# Patient Record
Sex: FEMALE
[De-identification: names, ages, dates, MRNs, and addresses within clinical notes are randomized per-mention and may not be internally consistent; named-entity substitution may affect disease eponyms.]

---

## 2022-04-08 ENCOUNTER — APPOINTMENT (OUTPATIENT)
Dept: PHYSICAL MEDICINE AND REHAB | Facility: CLINIC | Age: 72
End: 2022-04-08
Payer: MEDICARE

## 2022-04-08 DIAGNOSIS — Z86.79 PERSONAL HISTORY OF OTHER DISEASES OF THE CIRCULATORY SYSTEM: ICD-10-CM

## 2022-04-08 DIAGNOSIS — Z60.2 PROBLEMS RELATED TO LIVING ALONE: ICD-10-CM

## 2022-04-08 DIAGNOSIS — Z87.11 PERSONAL HISTORY OF PEPTIC ULCER DISEASE: ICD-10-CM

## 2022-04-08 DIAGNOSIS — Z78.9 OTHER SPECIFIED HEALTH STATUS: ICD-10-CM

## 2022-04-08 PROCEDURE — 99204 OFFICE O/P NEW MOD 45 MIN: CPT

## 2022-04-08 PROCEDURE — 72100 X-RAY EXAM L-S SPINE 2/3 VWS: CPT

## 2022-04-08 RX ORDER — AMLODIPINE BESYLATE 5 MG/1
TABLET ORAL
Refills: 0 | Status: ACTIVE | COMMUNITY

## 2022-04-08 RX ORDER — FEXOFENADINE HYDROCHLORIDE 180 MG/1
TABLET ORAL
Refills: 0 | Status: ACTIVE | COMMUNITY

## 2022-04-08 RX ORDER — FAMOTIDINE 10 MG/1
TABLET, FILM COATED ORAL
Refills: 0 | Status: ACTIVE | COMMUNITY

## 2022-04-08 RX ORDER — CYCLOBENZAPRINE HYDROCHLORIDE 7.5 MG/1
TABLET, FILM COATED ORAL
Refills: 0 | Status: ACTIVE | COMMUNITY

## 2022-04-08 SDOH — SOCIAL STABILITY - SOCIAL INSECURITY: PROBLEMS RELATED TO LIVING ALONE: Z60.2

## 2022-04-08 NOTE — HISTORY OF PRESENT ILLNESS
[FreeTextEntry1] : Location: back\par Severity: 8/10\par Duration: few months\par Timing: chronic\par Aggravating Factors: walking\par Alleviating Factors: rest\par Associated Symptoms: denies weight loss, fever, chills, change in bowel/bladder habits, redness, warmth, weakness, numbness/tingling, +radiation down right leg\par

## 2022-04-08 NOTE — PHYSICAL EXAM
[FreeTextEntry1] : JAMIE is a 71 year female \par Constitutional: healthy appearing, NAD, and overweight\par \par LUMBAR\par ROM: flexion to 30 deg, ext to 5 deg\par \par Gait: antalgic\par \par Inspection: no erythema, warmth\par Spine: no TTP in spinous process\par Bony palpation: no TTP in GT\par \par Soft tissue palpation hip: no TTP in gluteus martha\par Soft tissue palpation of spine: no TTP in lumbar paraspinals\par \par 5/5 bilateral KE, DF, PF \par sensation intact in bilat LE\par reflexes: knee and ankle 0 bilat\par \par Special tests: neg seated SLR\par \par

## 2022-04-08 NOTE — ASSESSMENT
[FreeTextEntry1] : Discussed diagnosis and treatment plan including PT.\par Reviewed exercises to do. \par She cannot take regular nsaids so will try Celebrex.  She will check with GI doctor first.\par Ice area often.\par Prednisone did not help so likely needs FANY.\par \par

## 2022-04-08 NOTE — DATA REVIEWED
[FreeTextEntry1] : office xrays of lumbar spine ap and lateral show mild DDD diffusely, grade 1 L4/5 anterolisthesis, moderate facet arthritis at L4-S1, incidental moderate hip OA bilaterally.

## 2022-04-25 ENCOUNTER — NON-APPOINTMENT (OUTPATIENT)
Age: 72
End: 2022-04-25

## 2022-04-26 ENCOUNTER — NON-APPOINTMENT (OUTPATIENT)
Age: 72
End: 2022-04-26

## 2022-04-29 RX ORDER — CELECOXIB 200 MG/1
200 CAPSULE ORAL DAILY
Qty: 14 | Refills: 0 | Status: ACTIVE | COMMUNITY
Start: 2022-04-08 | End: 1900-01-01

## 2022-05-05 ENCOUNTER — APPOINTMENT (OUTPATIENT)
Dept: PHYSICAL MEDICINE AND REHAB | Facility: CLINIC | Age: 72
End: 2022-05-05

## 2022-05-11 ENCOUNTER — APPOINTMENT (OUTPATIENT)
Dept: PHYSICAL MEDICINE AND REHAB | Facility: CLINIC | Age: 72
End: 2022-05-11
Payer: MEDICARE

## 2022-05-11 PROCEDURE — 64484 NJX AA&/STRD TFRM EPI L/S EA: CPT

## 2022-05-11 PROCEDURE — 64483 NJX AA&/STRD TFRM EPI L/S 1: CPT

## 2022-05-25 ENCOUNTER — APPOINTMENT (OUTPATIENT)
Dept: PHYSICAL MEDICINE AND REHAB | Facility: CLINIC | Age: 72
End: 2022-05-25
Payer: MEDICARE

## 2022-05-25 PROCEDURE — 99214 OFFICE O/P EST MOD 30 MIN: CPT | Mod: 25

## 2022-05-25 PROCEDURE — 20552 NJX 1/MLT TRIGGER POINT 1/2: CPT

## 2022-05-25 NOTE — ASSESSMENT
[FreeTextEntry1] : Discussed diagnosis and treatment plan including PT.  Starting PT soon.\par Reviewed exercises to do. \par She cannot take regular nsaids so will try Celebrex. Reminded to check with GI doctor first.\par Ice area often.\par Taught stretches for hamstring and calf.  Do it 3x/d. \par Try Gabapentin before sleeping. \par \par f/u 3 wk

## 2022-05-25 NOTE — HISTORY OF PRESENT ILLNESS
[FreeTextEntry1] : Location: back\par Severity: 7/10\par Duration: few months\par Timing: chronic\par Aggravating Factors: walking\par Alleviating Factors: rest\par Associated Symptoms: denies weight loss, fever, chills, change in bowel/bladder habits, redness, warmth, weakness, numbness, +tingling, +radiation down right leg, cramps\par \par 5/2022 right L5 and S1 FANY - moderate relief

## 2022-05-25 NOTE — PHYSICAL EXAM
[FreeTextEntry1] : JAMIE is a 71 year female \par Constitutional: healthy appearing, NAD, and overweight\par \par LUMBAR\par ROM: flexion to 30 deg, ext to 5 deg\par \par Gait: antalgic\par \par Inspection: no erythema, warmth\par Spine: no TTP in spinous process\par Bony palpation: no TTP in GT\par \par Soft tissue palpation hip: no TTP in gluteus martha\par Soft tissue palpation of spine: no TTP in lumbar paraspinals\par \par 5/5 bilateral KE, DF, PF \par sensation intact in bilat LE\par

## 2022-05-25 NOTE — PROCEDURE
[de-identified] : Indication: myofascial pain\par \par \par \par Trigger Point Tx\par After cleaning with alcohol, sterile needles were inserted into bilateral Ub 25 to 26, and right lateral hamstring and peroneus longus and manipulated intermittently followed by injection of 0.1 ml of 1% Lidocaine. The needles were then removed. Hemostasis was achieved immediately. She  tolerated the procedure well and was given discharge instructions and then discharged to home without any complaints or complications.\par \par Exp 5/2023\par Manufacture: [employer]\par NDC 2556-0679-20\par [id number]\par

## 2022-06-16 ENCOUNTER — APPOINTMENT (OUTPATIENT)
Dept: PHYSICAL MEDICINE AND REHAB | Facility: CLINIC | Age: 72
End: 2022-06-16
Payer: MEDICARE

## 2022-06-16 PROCEDURE — 99214 OFFICE O/P EST MOD 30 MIN: CPT | Mod: 25

## 2022-06-16 PROCEDURE — 20552 NJX 1/MLT TRIGGER POINT 1/2: CPT

## 2022-06-16 RX ORDER — BUSPIRONE HYDROCHLORIDE 10 MG/1
10 TABLET ORAL
Qty: 60 | Refills: 0 | Status: ACTIVE | COMMUNITY
Start: 2021-08-16

## 2022-06-16 RX ORDER — DESOXIMETASONE 0.5 MG/G
0.05 CREAM TOPICAL
Qty: 60 | Refills: 0 | Status: ACTIVE | COMMUNITY
Start: 2022-06-02

## 2022-06-16 RX ORDER — PREDNISONE 10 MG/1
10 TABLET ORAL
Qty: 14 | Refills: 0 | Status: ACTIVE | COMMUNITY
Start: 2022-03-24

## 2022-06-16 RX ORDER — DICLOFENAC SODIUM 1% 10 MG/G
1 GEL TOPICAL
Qty: 100 | Refills: 0 | Status: ACTIVE | COMMUNITY
Start: 2022-06-02

## 2022-06-16 RX ORDER — FAMOTIDINE 40 MG/1
40 TABLET, FILM COATED ORAL
Qty: 30 | Refills: 0 | Status: ACTIVE | COMMUNITY
Start: 2021-08-16

## 2022-06-16 RX ORDER — FLUTICASONE PROPIONATE 50 UG/1
50 SPRAY, METERED NASAL
Qty: 16 | Refills: 0 | Status: ACTIVE | COMMUNITY
Start: 2022-04-01

## 2022-06-16 RX ORDER — MONTELUKAST 10 MG/1
10 TABLET, FILM COATED ORAL
Qty: 30 | Refills: 0 | Status: ACTIVE | COMMUNITY
Start: 2021-06-23

## 2022-06-16 RX ORDER — AMLODIPINE BESYLATE 10 MG/1
10 TABLET ORAL
Qty: 30 | Refills: 0 | Status: ACTIVE | COMMUNITY
Start: 2022-04-01

## 2022-06-16 RX ORDER — OMEPRAZOLE 20 MG/1
20 CAPSULE, DELAYED RELEASE ORAL
Qty: 30 | Refills: 0 | Status: ACTIVE | COMMUNITY
Start: 2022-04-01

## 2022-06-16 RX ORDER — GLECAPREVIR AND PIBRENTASVIR 40; 100 MG/1; MG/1
100-40 TABLET, FILM COATED ORAL
Qty: 84 | Refills: 0 | Status: ACTIVE | COMMUNITY
Start: 2022-01-03

## 2022-06-16 NOTE — HISTORY OF PRESENT ILLNESS
[FreeTextEntry1] : Location: back\par Severity: 5/10\par Duration: few months\par Timing: chronic\par Aggravating Factors: walking\par Alleviating Factors: rest\par Associated Symptoms: denies weight loss, fever, chills, change in bowel/bladder habits, redness, warmth, weakness, numbness, +tingling, +radiation down right leg, cramps\par \par 5/2022 right L5 and S1 FANY - moderate relief \par

## 2022-06-16 NOTE — PROCEDURE
[de-identified] : Indication: myofascial pain\par \par \par \par Trigger Point Tx\par After cleaning with alcohol, sterile needles were inserted into right Ub 25 to 26, gluteus martha, lateral hamstring, calf and Gb 39 and manipulated intermittently followed by injection of 0.1 ml of 1% Lidocaine. The needles were then removed. Hemostasis was achieved immediately. She tolerated the procedure well and was given discharge instructions and then discharged to home without any complaints or complications.\par \par Exp 5/2023\par Manufacture: [employer]\par NDC 9060-6539-63\par [id number]

## 2022-06-16 NOTE — PHYSICAL EXAM
[FreeTextEntry1] : JAMIE is a 71 year female \par Constitutional: healthy appearing, NAD, and overweight\par \par LUMBAR\par ROM: flexion to 30 deg, ext to 5 deg\par \par Gait: antalgic\par \par Inspection: no erythema, warmth\par Spine: no TTP in spinous process\par Bony palpation: no TTP in GT\par \par Soft tissue palpation hip: no TTP in gluteus martha\par Soft tissue palpation of spine: no TTP in lumbar paraspinals\par \par 5/5 bilateral KE, DF, PF \par sensation intact in bilat LE

## 2022-06-16 NOTE — ASSESSMENT
[FreeTextEntry1] : Discussed diagnosis and treatment plan including PT. Continue PT. \par Reviewed exercises to do. \par She cannot take regular nsaids.  She did not want to try Celebrex so did not ask GI doctor.\par Ice area often then mansoor after exercise.\par Do stretches for hamstring and calf. Do it 3x/d. \par Reminded to try Gabapentin before sleeping. \par \par f/u 3 wk

## 2022-06-23 ENCOUNTER — APPOINTMENT (OUTPATIENT)
Dept: PHYSICAL MEDICINE AND REHAB | Facility: CLINIC | Age: 72
End: 2022-06-23
Payer: MEDICARE

## 2022-06-23 DIAGNOSIS — M79.18 MYALGIA, OTHER SITE: ICD-10-CM

## 2022-06-23 PROCEDURE — 99214 OFFICE O/P EST MOD 30 MIN: CPT | Mod: 25

## 2022-06-23 PROCEDURE — 20552 NJX 1/MLT TRIGGER POINT 1/2: CPT

## 2022-06-23 NOTE — ASSESSMENT
[FreeTextEntry1] : Discussed diagnosis and treatment plan including PT. Continue PT. \par Reviewed exercises to do. \par She cannot take regular nsaids. Try Celebrex - ask GI doctor if she can.\par Ice area often then mansoor after exercise.\par Do stretches for hamstring and calf. Do it often mansoor before sleeping.\par Reminded to try Gabapentin before sleeping. She is afraid of sedation but we are trying lowest dose. \par \par Will do joe again to avoid surgery.  Try biking.

## 2022-06-23 NOTE — HISTORY OF PRESENT ILLNESS
[FreeTextEntry1] : Location: back\par Severity: 7/10 worse lately\par Duration: few months\par Timing: chronic\par Aggravating Factors: walking\par Alleviating Factors: rest\par Associated Symptoms: denies weight loss, fever, chills, change in bowel/bladder habits, redness, warmth, weakness, numbness, +tingling, +radiation down right leg, cramps\par \par 5/2022 right L5 and S1 FANY - moderate relief \par

## 2022-06-23 NOTE — PROCEDURE
[de-identified] : Indication: myofascial pain\par \par \par \par Trigger Point Tx\par After cleaning with alcohol, sterile needles were inserted into right Ub 25 to 26, gluteus martha, lateral  anterior tib, and Gb 39 and manipulated intermittently followed by injection of 0.1 ml of 1% Lidocaine. The needles were then removed. Hemostasis was achieved immediately. She tolerated the procedure well and was given discharge instructions and then discharged to home without any complaints or complications.\par \par Exp 5/2023\par Manufacture: [employer]\par NDC 0048-1826-79\par [id number]

## 2022-07-08 ENCOUNTER — APPOINTMENT (OUTPATIENT)
Dept: PHYSICAL MEDICINE AND REHAB | Facility: CLINIC | Age: 72
End: 2022-07-08

## 2022-07-08 PROCEDURE — 62323 NJX INTERLAMINAR LMBR/SAC: CPT

## 2022-07-12 ENCOUNTER — APPOINTMENT (OUTPATIENT)
Dept: PHYSICAL MEDICINE AND REHAB | Facility: CLINIC | Age: 72
End: 2022-07-12

## 2022-07-12 PROCEDURE — 99214 OFFICE O/P EST MOD 30 MIN: CPT

## 2022-07-12 RX ORDER — GABAPENTIN 100 MG/1
100 CAPSULE ORAL
Qty: 30 | Refills: 0 | Status: ACTIVE | COMMUNITY
Start: 2022-05-03 | End: 1900-01-01

## 2022-07-12 NOTE — ASSESSMENT
[FreeTextEntry1] : Discussed diagnosis and treatment plan including PT. Restart PT. \par Reviewed exercises to do. \par Taught back stretch.\par Avoid back ext.\par Educated to lose weight.\par Do not use brace all the time and wean off shortly.\par \par f/u 1 month

## 2022-08-10 ENCOUNTER — APPOINTMENT (OUTPATIENT)
Dept: PHYSICAL MEDICINE AND REHAB | Facility: CLINIC | Age: 72
End: 2022-08-10

## 2022-08-10 DIAGNOSIS — Z00.00 ENCOUNTER FOR GENERAL ADULT MEDICAL EXAMINATION W/OUT ABNORMAL FINDINGS: ICD-10-CM

## 2022-08-10 PROCEDURE — 99214 OFFICE O/P EST MOD 30 MIN: CPT

## 2022-08-10 NOTE — ASSESSMENT
[FreeTextEntry1] : Discussed diagnosis and treatment plan including PT. Restart PT because she needs motivation to do exercise. \par Reviewed exercises to do. \par Do back stretch.\par Avoid back ext.\par Educated to quit smoking\par She was afraid to try gabapentin.  Try it at night for leg pain. \par continue turmeric\par \par f/u 1 month

## 2022-08-10 NOTE — HISTORY OF PRESENT ILLNESS
[FreeTextEntry1] : Location: back\par Severity: 4/10\par Duration: few months\par Timing: chronic\par Aggravating Factors: walking\par Alleviating Factors: rest\par Associated Symptoms: denies weight loss, fever, chills, change in bowel/bladder habits, redness, warmth, weakness, numbness, +tingling, +radiation down right leg, cramps\par \par 5/2022 right L5 and S1 FANY - moderate relief \par 6/2022 caudal FANY - 90% relief \par

## 2022-09-13 ENCOUNTER — APPOINTMENT (OUTPATIENT)
Dept: PHYSICAL MEDICINE AND REHAB | Facility: CLINIC | Age: 72
End: 2022-09-13

## 2022-09-13 DIAGNOSIS — M17.11 UNILATERAL PRIMARY OSTEOARTHRITIS, RIGHT KNEE: ICD-10-CM

## 2022-09-13 PROCEDURE — 99214 OFFICE O/P EST MOD 30 MIN: CPT

## 2022-09-13 PROCEDURE — 73562 X-RAY EXAM OF KNEE 3: CPT | Mod: RT

## 2022-09-13 NOTE — ASSESSMENT
[FreeTextEntry1] : Discussed diagnosis and treatment plan including PT. Restart PT because she needs motivation to do exercise. She did not go last month. \par Reviewed exercises to do. \par Do back stretch.\par Avoid back ext.\par Educated again to quit smoking\par She was afraid to try gabapentin. Try it at night for leg pain. \par continue turmeric\par \par knee - educated to lose weight\par ice area often

## 2022-09-13 NOTE — PHYSICAL EXAM
[FreeTextEntry1] : JAMIE is a 71 year female \par Constitutional: healthy appearing, NAD, and overweight\par \par LUMBAR\par ROM: flexion to 30 deg, ext to 5 deg\par \par Gait: antalgic\par \par Inspection: no erythema, warmth\par Spine: no TTP in spinous process\par Bony palpation: no TTP in GT\par \par Soft tissue palpation hip: no TTP in gluteus martha\par Soft tissue palpation of spine: no TTP in lumbar paraspinals\par \par 5/5 bilateral KE, DF, PF \par sensation intact in bilat LE\par \par right knee:\par no swelling, warmth\par flexion to 100 deg

## 2022-09-13 NOTE — HISTORY OF PRESENT ILLNESS
[FreeTextEntry1] : Location: back\par Severity: moderate\par Duration: few months\par Timing: chronic\par Aggravating Factors: walking\par Alleviating Factors: rest\par Associated Symptoms: denies weight loss, fever, chills, change in bowel/bladder habits, weakness, numbness, +tingling, +radiation down right leg, cramps\par \par 5/2022 right L5 and S1 FANY - moderate relief \par 6/2022 caudal FANY - 90% relief \par \par Right knee hurting lately.  7/10.  Pain with walking.  +Swelling.

## 2022-09-13 NOTE — DATA REVIEWED
[FreeTextEntry1] : office xrays of left knee ap, lat, sunrise show mild lateral and moderate PTF oa.

## 2022-10-04 ENCOUNTER — APPOINTMENT (OUTPATIENT)
Dept: PHYSICAL MEDICINE AND REHAB | Facility: CLINIC | Age: 72
End: 2022-10-04

## 2022-10-18 ENCOUNTER — APPOINTMENT (OUTPATIENT)
Dept: PHYSICAL MEDICINE AND REHAB | Facility: CLINIC | Age: 72
End: 2022-10-18

## 2022-10-18 PROCEDURE — 99213 OFFICE O/P EST LOW 20 MIN: CPT

## 2022-10-18 NOTE — HISTORY OF PRESENT ILLNESS
[FreeTextEntry1] : Location: back\par Severity: severe, worsening lately\par Duration: few months\par Timing: chronic\par Aggravating Factors: walking\par Alleviating Factors: rest\par Associated Symptoms: denies weight loss, fever, chills, change in bowel/bladder habits, weakness, numbness, +tingling, +radiation down right leg, cramps\par \par 5/2022 right L5 and S1 FANY - moderate relief \par 6/2022 caudal FANY - 90% relief \par \par

## 2022-10-18 NOTE — ASSESSMENT
[FreeTextEntry1] : Discussed diagnosis and treatment plan including PT. Restart PT because she needs motivation to do exercise. She did not go last month. \par Reviewed exercises to do. \par Educated again to quit smoking\par She was afraid to try gabapentin.\par continue turmeric\par She has tried conservative tx including PT, nsaids for 3 months without relief. Discussed FANY in detail.  Risks include bleeding.  Stop nsaids 2 days before.\par \par She will continue a comprehensive rehabilitation program afterward, as this is important to prevent recurrence of pain.\par

## 2022-11-22 ENCOUNTER — APPOINTMENT (OUTPATIENT)
Dept: PHYSICAL MEDICINE AND REHAB | Facility: CLINIC | Age: 72
End: 2022-11-22

## 2022-11-22 PROCEDURE — 62323 NJX INTERLAMINAR LMBR/SAC: CPT

## 2022-11-22 RX ORDER — AZITHROMYCIN 250 MG/1
250 TABLET, FILM COATED ORAL
Qty: 6 | Refills: 0 | Status: DISCONTINUED | COMMUNITY
Start: 2022-10-12

## 2022-11-22 NOTE — PROCEDURE
[de-identified] : indication: pain\par \par Fluoroscopically Guided, Contrast Enhanced, Caudal Epidural Steroid Injection\par \par After informed consent, she was placed prone on the fluoroscopy table.  Skin over the area was prepped and draped in the usual sterile fashion before being anesthetized with 1% Lidocaine.\par \par Then a 22 gauge 5 in needle was directed down to the sacrococcygeal ligament, sacral hiatus.  Needle placement was confirmed with AP and lateral fluoroscopic images.  After negative aspiration for blood or cerebrospinal fluid, contrast was instilled under live fluoroscopy and an epidurogram was obtained.  It showed good epidural flow without any vascular uptake.  Then a steroid solution consisting of 40 mg of Kenalog, 4 ml of 0.5% Lidocaine was instilled.  Following the procedure, the needle was removed and the skin was cleaned, the skin was cleaned, and a sterile dressing was applied.  She  tolerated the procedure well and was discharged in good condition without any complications or complaints.  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

## 2022-11-22 NOTE — ASSESSMENT
[FreeTextEntry1] : f/u 3 wk\par \par She  denies being sick or having f/c, SOB, cough.  She  understands risk of immunosuppression from steroids.  Do not get covid vaccine for 2 wk.\par

## 2023-01-05 ENCOUNTER — APPOINTMENT (OUTPATIENT)
Dept: PHYSICAL MEDICINE AND REHAB | Facility: CLINIC | Age: 73
End: 2023-01-05
Payer: MEDICARE

## 2023-01-05 PROCEDURE — 99213 OFFICE O/P EST LOW 20 MIN: CPT

## 2023-01-06 NOTE — PHYSICAL EXAM
[FreeTextEntry1] : JAMIE is a 72 year old female \par Constitutional: healthy appearing, NAD, and overweight\par \par LUMBAR\par ROM: flexion to 30 deg, ext to 5 deg\par \par Gait: antalgic\par \par Inspection: no erythema, warmth\par Spine: no TTP in spinous process\par Bony palpation: no TTP in GT\par \par Soft tissue palpation hip: no TTP in gluteus martha\par Soft tissue palpation of spine: no TTP in lumbar paraspinals\par \par 5/5 bilateral KE, DF, PF \par sensation intact in bilat LE\par

## 2023-01-06 NOTE — HISTORY OF PRESENT ILLNESS
[FreeTextEntry1] : Location: back\par Severity: 4/10\par Duration: few months\par Timing: chronic\par Aggravating Factors: walking\par Alleviating Factors: rest\par Associated Symptoms: denies weight loss, fever, chills, change in bowel/bladder habits, weakness, numbness, +tingling, +radiation down right leg, cramps\par \par 5/2022 right L5 and S1 FANY - moderate relief \par 6/2022 caudal FANY - 90% relief \par 11/2022 caudal FANY - over 50% relief

## 2023-01-06 NOTE — ASSESSMENT
[FreeTextEntry1] : Discussed diagnosis and treatment plan including PT. Restart PT because she needs motivation to do exercise. She did not go yet.  Do HEP. \par Reviewed exercises to do. \par Educated again to quit smoking\par She was afraid to try gabapentin.\par continue turmeric\par \par f/u 2 wk

## 2023-03-09 ENCOUNTER — APPOINTMENT (OUTPATIENT)
Dept: PHYSICAL MEDICINE AND REHAB | Facility: CLINIC | Age: 73
End: 2023-03-09
Payer: MEDICARE

## 2023-03-09 PROCEDURE — 99213 OFFICE O/P EST LOW 20 MIN: CPT

## 2023-03-09 NOTE — ASSESSMENT
[FreeTextEntry1] : Discussed diagnosis and treatment plan including PT. Restart PT because she needs motivation to do exercise. \par Educated to do HEP regularly even if pain improves.\par Reviewed exercises to do. Taught back ext on ball and DF exercise.\par \par Educated again to quit smoking\par She was afraid to try gabapentin.\par continue turmeric\par Discussed repeat FANY and she wants to do it in April.

## 2023-03-09 NOTE — HISTORY OF PRESENT ILLNESS
[FreeTextEntry1] : Location: back\par Severity: 6/10 starting to get worse\par Duration: few months\par Timing: chronic\par Aggravating Factors: walking\par Alleviating Factors: rest\par Associated Symptoms: denies weight loss, fever, chills, change in bowel/bladder habits, weakness, numbness, +tingling, +radiation down right leg, cramps\par \par 5/2022 right L5 and S1 FANY - moderate relief \par 6/2022 caudal FANY - 90% relief \par 11/2022 caudal FANY - over 50% relief \par

## 2023-04-17 ENCOUNTER — APPOINTMENT (OUTPATIENT)
Dept: PHYSICAL MEDICINE AND REHAB | Facility: CLINIC | Age: 73
End: 2023-04-17
Payer: MEDICARE

## 2023-04-17 PROCEDURE — 62323 NJX INTERLAMINAR LMBR/SAC: CPT

## 2023-04-17 NOTE — PROCEDURE
[de-identified] : indication: pain\par \par Fluoroscopically Guided, Contrast Enhanced, Caudal Epidural Steroid Injection\par \par After informed consent, she was placed prone on the fluoroscopy table.  Skin over the area was prepped and draped in the usual sterile fashion before being anesthetized with 1% Lidocaine.\par \par Then a 22 gauge 3 1/2 in needle was directed down to the sacrococcygeal ligament, sacral hiatus.  Needle placement was confirmed with AP and lateral fluoroscopic images.  After negative aspiration for blood or cerebrospinal fluid, contrast was instilled under live fluoroscopy and an epidurogram was obtained.  It showed good epidural flow without any vascular uptake.  Then a steroid solution consisting of 40 mg of Kenalog, 4 ml of 0.5% Lidocaine was instilled.  Following the procedure, the needle was removed and the skin was cleaned, the skin was cleaned, and a sterile dressing was applied.  She  tolerated the procedure well and was discharged in good condition without any complications or complaints.  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

## 2023-04-17 NOTE — ASSESSMENT
[FreeTextEntry1] : She  denies being sick or having f/c, SOB, cough.  She  understands risk of immunosuppression from steroids.  Do not get covid vaccine for 2 wk.\par \par She had some immediate relief.  \par \par f/u 3 wk

## 2023-05-09 ENCOUNTER — APPOINTMENT (OUTPATIENT)
Dept: PHYSICAL MEDICINE AND REHAB | Facility: CLINIC | Age: 73
End: 2023-05-09
Payer: MEDICARE

## 2023-05-09 PROCEDURE — 99214 OFFICE O/P EST MOD 30 MIN: CPT

## 2023-05-09 NOTE — HISTORY OF PRESENT ILLNESS
[FreeTextEntry1] : Location: back\par Severity: 6/10 \par Duration: few months\par Timing: chronic\par Aggravating Factors: walking\par Alleviating Factors: rest\par Associated Symptoms: denies weight loss, fever, chills, change in bowel/bladder habits, weakness, numbness, +tingling, +radiation down right leg, cramps\par \par 5/2022 right L5 and S1 FANY - moderate relief \par 6/2022 caudal FANY - 90% relief \par 11/2022 caudal FANY - over 50% relief \par 4/2023 caudal FANY

## 2023-05-09 NOTE — ASSESSMENT
[FreeTextEntry1] : Discussed diagnosis and treatment plan including PT. Restart PT because she needs motivation to do exercise. \par Educated to quit smoking. \par \par Will repeat FANY via TF route to avoid surgery.\par Try gabapentin before sleeping.\par continue turmeric\par only use 1 cream at a time\par \par

## 2023-05-26 ENCOUNTER — APPOINTMENT (OUTPATIENT)
Dept: PHYSICAL MEDICINE AND REHAB | Facility: CLINIC | Age: 73
End: 2023-05-26
Payer: MEDICARE

## 2023-05-26 PROCEDURE — 64484 NJX AA&/STRD TFRM EPI L/S EA: CPT | Mod: RT

## 2023-05-26 PROCEDURE — 64483 NJX AA&/STRD TFRM EPI L/S 1: CPT | Mod: RT

## 2023-05-26 NOTE — PROCEDURE
[de-identified] : indication: pain\par \par Fluoroscopically Guided, Contrast Enhanced, Right L5 and S1 Epidural Steroid Injection\par \par After informed consent, She was placed prone on the fluoroscopy table. Skin over the area was prepped and draped in the usual sterile fashion before being anesthetized with 1% Lidocaine. Then using an oblique approach, a 22 gauge 5 in spinal needle was directed down to the L5/S1 NF.   Needle placement was confirmed with AP fluoroscopic images. After negative aspiration for blood or cerebrospinal fluid, contrast was instilled under live fluoroscopy and an epidurogram was obtained. It showed good epidural flow without any vascular uptake. Then a steroid solution consisting of 20 mg of Kenalog, 1 ml of 0.5% Lidocaine was instilled. \par \par Then using a 22 gauge 3.5 in spinal needle was directed down to the S1 foramen.   Needle placement was confirmed with lateral fluoroscopic images. After negative aspiration for blood or cerebrospinal fluid, contrast was instilled under live fluoroscopy and an epidurogram was obtained. It showed good epidural flow without any vascular uptake. Then a steroid solution consisting of 20 mg of Kenalog, 1.5 ml of 0.5% Lidocaine was instilled. \par \par  She  was discharged in good condition.  Instructions given:  No soaking or bathing for 2 days but can take a shower.  No strenuous exercise for 4 days. \par \par \par \par Manufacture GE\par Omnipaque 240\par NDC 8644146769\par Exp 5/4/24\par [id number]\par \par Triamcinolone\par NDC [address]-8\par Exp 6/24\par Lot [id number]\par Manufacture [employer]\par \par \par Lidocaine\par Manufacture [employer]\par NDC 58701[address]-46\par Exp 6/24\par LOT [id number]\par \par

## 2023-05-26 NOTE — ASSESSMENT
[FreeTextEntry1] : Educated to quit smoking.\par \par She  denies being sick or having f/c, SOB, cough.  She  understands risk of immunosuppression from steroids.  Do not get covid vaccine for 2 wk.\par \par f/u 3 wk

## 2023-06-16 ENCOUNTER — APPOINTMENT (OUTPATIENT)
Dept: PHYSICAL MEDICINE AND REHAB | Facility: CLINIC | Age: 73
End: 2023-06-16
Payer: MEDICARE

## 2023-06-16 PROCEDURE — 99213 OFFICE O/P EST LOW 20 MIN: CPT

## 2023-06-16 NOTE — HISTORY OF PRESENT ILLNESS
[FreeTextEntry1] : Location: back\par Severity: 2/10 \par Duration: few months\par Timing: chronic\par Aggravating Factors: walking\par Alleviating Factors: rest\par Associated Symptoms: denies weight loss, fever, chills, change in bowel/bladder habits, weakness, numbness, tingling, +not much radiation down right leg\par \par 5/2022 right L5 and S1 FANY - moderate relief \par 6/2022 caudal FANY - 90% relief \par 11/2022 caudal FANY - over 50% relief \par 4/2023 caudal FANY \par 5/2023 right L5 and S1 FANY - over 60% relief

## 2023-06-16 NOTE — ASSESSMENT
[FreeTextEntry1] : Discussed diagnosis and treatment plan including PT. Restart PT because she needs motivation to do exercise. \par Educated to quit smoking. \par Does not need gabapentin at this time. \par continue turmeric\par only use 1 cream at a time\par Get ankle weights to do KE.\par \par f/u 2 months\par See podiatrist for 1st MTP pain.  Wear good shoes with wide toe box

## 2023-08-18 ENCOUNTER — APPOINTMENT (OUTPATIENT)
Dept: PHYSICAL MEDICINE AND REHAB | Facility: CLINIC | Age: 73
End: 2023-08-18
Payer: MEDICARE

## 2023-08-18 PROCEDURE — 99214 OFFICE O/P EST MOD 30 MIN: CPT

## 2023-08-18 NOTE — PHYSICAL EXAM
CBC
[FreeTextEntry1] : JAMIE is a 72 year old female  Constitutional: healthy appearing, NAD, and overweight  LUMBAR  ROM: flexion to 30 deg, ext to 5 deg  Gait: antalgic  Inspection: no erythema, warmth Spine: no TTP in spinous process Bony palpation: no TTP in GT  Soft tissue palpation hip: no TTP in gluteus martha Soft tissue palpation of spine: no TTP in lumbar paraspinals  5/5 bilateral KE, DF, PF sensation intact in bilat LE

## 2023-08-18 NOTE — HISTORY OF PRESENT ILLNESS
[FreeTextEntry1] : Location: back Severity: 5/10 getting worse Duration: few months Timing: chronic Aggravating Factors: walking Alleviating Factors: rest Associated Symptoms: denies weight loss, fever, chills, change in bowel/bladder habits, weakness, numbness, tingling, +radiation down right leg  5/2022 right L5 and S1 FANY - moderate relief 6/2022 caudal FANY - 90% relief 11/2022 caudal FANY - over 50% relief 4/2023 caudal FANY 5/2023 right L5 and S1 FANY - over 60% relief

## 2023-08-18 NOTE — ASSESSMENT
[FreeTextEntry1] : MRI shows mod/severe stenosis.  Discussed diagnosis and treatment plan including PT. Restart PT because she needs motivation to do exercise. She never went.   Educated to quit smoking. Does not need gabapentin at this time. continue turmeric use cream prn massage back with tennis ball Get ankle weights to do KE. consider joe in Sept if not better discussed surgery but does not want it at this time

## 2023-09-15 ENCOUNTER — APPOINTMENT (OUTPATIENT)
Dept: PHYSICAL MEDICINE AND REHAB | Facility: CLINIC | Age: 73
End: 2023-09-15
Payer: MEDICARE

## 2023-09-15 PROCEDURE — 99213 OFFICE O/P EST LOW 20 MIN: CPT

## 2023-10-13 ENCOUNTER — APPOINTMENT (OUTPATIENT)
Dept: PHYSICAL MEDICINE AND REHAB | Facility: CLINIC | Age: 73
End: 2023-10-13
Payer: MEDICARE

## 2023-10-13 PROCEDURE — 64493 INJ PARAVERT F JNT L/S 1 LEV: CPT | Mod: RT

## 2023-10-13 PROCEDURE — 64494 INJ PARAVERT F JNT L/S 2 LEV: CPT | Mod: RT

## 2023-11-15 ENCOUNTER — APPOINTMENT (OUTPATIENT)
Dept: PHYSICAL MEDICINE AND REHAB | Facility: CLINIC | Age: 73
End: 2023-11-15
Payer: MEDICARE

## 2023-11-15 PROCEDURE — 99213 OFFICE O/P EST LOW 20 MIN: CPT

## 2023-11-29 ENCOUNTER — APPOINTMENT (OUTPATIENT)
Dept: PHYSICAL MEDICINE AND REHAB | Facility: CLINIC | Age: 73
End: 2023-11-29
Payer: MEDICARE

## 2023-11-29 PROCEDURE — 64493 INJ PARAVERT F JNT L/S 1 LEV: CPT | Mod: RT

## 2023-11-29 PROCEDURE — 64494 INJ PARAVERT F JNT L/S 2 LEV: CPT | Mod: RT

## 2023-12-27 NOTE — HISTORY OF PRESENT ILLNESS
[FreeTextEntry1] : Location: back\par Severity: 0/10\par Duration: few months\par Timing: chronic\par Aggravating Factors: walking\par Alleviating Factors: rest\par Associated Symptoms: denies weight loss, fever, chills, change in bowel/bladder habits, redness, warmth, weakness, numbness, +tingling, +radiation down right leg, cramps\par \par 5/2022 right L5 and S1 FANY - moderate relief \par 6/2022 caudal FANY - 90% relief The patient is a 65y Male complaining of flu-like symptoms.

## 2024-03-20 ENCOUNTER — APPOINTMENT (OUTPATIENT)
Dept: PHYSICAL MEDICINE AND REHAB | Facility: CLINIC | Age: 74
End: 2024-03-20
Payer: MEDICARE

## 2024-03-20 DIAGNOSIS — M47.816 SPONDYLOSIS W/OUT MYELOPATHY OR RADICULOPATHY, LUMBAR REGION: ICD-10-CM

## 2024-03-20 PROCEDURE — 64635 DESTROY LUMB/SAC FACET JNT: CPT | Mod: RT

## 2024-03-20 PROCEDURE — 64636Z: CUSTOM | Mod: RT

## 2024-03-20 NOTE — PROCEDURE
[de-identified] : indication: pain  Radiofrequency Ablation of Right L3, L4, L5 Medial Branch Nerves  After informed consent, the patient was placed prone on the fluoroscopy table. Skin over the area was prepped and draped in the usual sterile fashion before being anesthetized with 1% Lidocaine. Then using an oblique approach, a 3  in Cosman needle with 10 mm tip was directed down to the junction of the lateral superior articular process and the sacral ala. Needle placement was confirmed with AP fluoroscopic images. After negative aspiration for blood or cerebrospinal fluid, sensory and motor stimulation was checked using the Cosman machine. There was no pain down the legs or muscle activation in the legs. Then 1 ml of 1% Lidocaine was instilled. The same procedure was repeated by placing the needle at the junction of the   L4 SAP and L5 SAP and their corresponding transverse processes. Then radiofrequency ablation was performed at 80 deg C for 90 sec at each level.  She was discharged in good condition.   Exp 6/2025 Manufacture: [employer] NDC 22382-330-04 LOT [id number]

## 2024-04-17 ENCOUNTER — APPOINTMENT (OUTPATIENT)
Dept: PHYSICAL MEDICINE AND REHAB | Facility: CLINIC | Age: 74
End: 2024-04-17
Payer: MEDICARE

## 2024-04-17 PROCEDURE — 99213 OFFICE O/P EST LOW 20 MIN: CPT

## 2024-04-17 RX ORDER — ALENDRONATE SODIUM 70 MG/1
TABLET ORAL
Refills: 0 | Status: ACTIVE | COMMUNITY

## 2024-04-17 NOTE — ASSESSMENT
[FreeTextEntry1] : Discussed diagnosis and treatment plan including PT. continue turmeric can try stationary bike still does not want gabapentin Taught hamstring stretch  She has tried conservative tx including PT, nsaids for past 3 months without relief. Discussed FANY in detail.  Risks include bleeding.  Stop nsaids 2 days before.   She will continue a comprehensive rehabilitation program afterward, as this is important to prevent recurrence of pain. DEXA reviewed.  -5.6 in lumbar, less in other areas

## 2024-04-17 NOTE — HISTORY OF PRESENT ILLNESS
[FreeTextEntry1] : Location: back and leg Severity: 8/10 worse again Duration: over 1 yr Timing: chronic Aggravating Factors: walking Alleviating Factors: rest Associated Symptoms: weight loss due to pain, denies fever, chills, change in bowel/bladder habits, weakness, numbness, tingling, +radiation down right leg  5/2022 right L5 and S1 FANY - moderate relief 6/2022 caudal FANY - 90% relief 11/2022 caudal FANY - over 50% relief 4/2023 caudal FANY 5/2023 right L5 and S1 FANY - over 60% relief 10/2023 Right L4/5 and L5/S1 Facet Injections - 90% relief 3/2024 RFA Right L3, L4, L5 Medial Branch Nerves - 80% relief in back

## 2024-04-17 NOTE — PHYSICAL EXAM
[FreeTextEntry1] : JAMIE is a 73 year old female  Constitutional: healthy appearing, NAD, and overweight  LUMBAR  ROM: flexion to 30 deg, ext to 5 deg  Gait: antalgic  Inspection: no erythema, warmth Spine: no TTP in spinous process Bony palpation: no TTP in GT  Soft tissue palpation hip: no TTP in gluteus martha Soft tissue palpation of spine: no TTP in lumbar paraspinals  5/5 bilateral KE, DF, PF sensation intact in bilat LE

## 2024-04-22 ENCOUNTER — APPOINTMENT (OUTPATIENT)
Dept: PHYSICAL MEDICINE AND REHAB | Facility: CLINIC | Age: 74
End: 2024-04-22
Payer: MEDICARE

## 2024-04-22 PROCEDURE — 64484 NJX AA&/STRD TFRM EPI L/S EA: CPT | Mod: RT

## 2024-04-22 PROCEDURE — 64483 NJX AA&/STRD TFRM EPI L/S 1: CPT | Mod: RT

## 2024-04-22 NOTE — PROCEDURE
[de-identified] : indication: pain   Fluoroscopically Guided, Contrast Enhanced, Right L5 and S1 Epidural Steroid Injection   After informed consent, She was placed prone on the fluoroscopy table. Skin over the area was prepped and draped in the usual sterile fashion before being anesthetized with 1% Lidocaine. Then using an oblique approach, a 22 gauge 5 in spinal needle was directed down to the L5/S1 NF.   Needle placement was confirmed with AP fluoroscopic images. After negative aspiration for blood or cerebrospinal fluid, contrast was instilled under live fluoroscopy and an epidurogram was obtained. It showed good epidural flow without any vascular uptake. Then a steroid solution consisting of 20 mg of Kenalog, 1 ml of 0.5% Lidocaine was instilled.    Then using a 22 gauge 3.5 in spinal needle was directed down to the S1 foramen.   Needle placement was confirmed with lateral fluoroscopic images. After negative aspiration for blood or cerebrospinal fluid, contrast was instilled under live fluoroscopy and an epidurogram was obtained. It showed good epidural flow without any vascular uptake. Then a steroid solution consisting of 20 mg of Kenalog, 1.5 ml of 0.5% Lidocaine was instilled.    She  was discharged in good condition.  Instructions given:  No soaking or bathing for 2 days but can take a shower.  No strenuous exercise for 4 days.     Manufacture GE Omnipaque 180 NDC [id number] Exp 12/15/25 [id number]      Lidocaine Manufacture [employer]abl NDC 93867-873-35 Exp 6/24 LOT [id number]   Triamcinolone NDC [address]-9 Exp 6/24 Lot [id number] Manufacture [employer]

## 2024-04-22 NOTE — ASSESSMENT
[FreeTextEntry1] : She understands risk of immunosuppression from steroids.  Do not get covid vaccine for 2 wk.  f/u 1 month

## 2024-05-29 ENCOUNTER — APPOINTMENT (OUTPATIENT)
Dept: PHYSICAL MEDICINE AND REHAB | Facility: CLINIC | Age: 74
End: 2024-05-29
Payer: MEDICARE

## 2024-05-29 DIAGNOSIS — M48.062 SPINAL STENOSIS, LUMBAR REGION WITH NEUROGENIC CLAUDICATION: ICD-10-CM

## 2024-05-29 DIAGNOSIS — M21.371 FOOT DROP, RIGHT FOOT: ICD-10-CM

## 2024-05-29 PROCEDURE — 99214 OFFICE O/P EST MOD 30 MIN: CPT

## 2024-05-29 NOTE — ASSESSMENT
[FreeTextEntry1] : Discussed diagnosis and treatment plan including PT. continue turmeric can try stationary bike still does not want gabapentin do hamstring stretch will give letter for access a ride sent for EMG  DEXA: -5.6 in lumbar, less in other areas

## 2024-05-29 NOTE — PHYSICAL EXAM
[FreeTextEntry1] : JAMIE is a 73 year old female  Constitutional: healthy appearing, NAD, and overweight  LUMBAR  ROM: flexion to 30 deg, ext to 5 deg  Gait: antalgic  Inspection: no erythema, warmth Spine: no TTP in spinous process Bony palpation: no TTP in GT  Soft tissue palpation hip: no TTP in gluteus martha Soft tissue palpation of spine: no TTP in lumbar paraspinals  5/5 bilateral KE, PF and left DF, 4 right DF and EHL sensation intact in bilat LE

## 2024-05-29 NOTE — HISTORY OF PRESENT ILLNESS
[FreeTextEntry1] : Location: back and leg Severity: 7/10  Duration: over 1 yr Timing: chronic Aggravating Factors: walking Alleviating Factors: rest Associated Symptoms: weight loss due to pain, denies fever, chills, change in bowel/bladder habits, +weakness for 3 wk, numbness, tingling, +radiation down right leg  5/2022 right L5 and S1 FANY - moderate relief 6/2022 caudal FANY - 90% relief 11/2022 caudal FANY - over 50% relief 4/2023 caudal FANY 5/2023 right L5 and S1 FANY - over 60% relief 10/2023 Right L4/5 and L5/S1 Facet Injections - 90% relief 3/2024 RFA Right L3, L4, L5 Medial Branch Nerves - 80% relief in back 4/2024 right L5 and S1 FANY

## 2024-06-05 ENCOUNTER — NON-APPOINTMENT (OUTPATIENT)
Age: 74
End: 2024-06-05

## 2024-07-23 ENCOUNTER — NON-APPOINTMENT (OUTPATIENT)
Age: 74
End: 2024-07-23

## 2024-07-24 ENCOUNTER — APPOINTMENT (OUTPATIENT)
Dept: PHYSICAL MEDICINE AND REHAB | Facility: CLINIC | Age: 74
End: 2024-07-24
Payer: MEDICARE

## 2024-07-24 VITALS
SYSTOLIC BLOOD PRESSURE: 120 MMHG | HEART RATE: 95 BPM | BODY MASS INDEX: 21.17 KG/M2 | HEIGHT: 64 IN | DIASTOLIC BLOOD PRESSURE: 88 MMHG | RESPIRATION RATE: 18 BRPM | WEIGHT: 124 LBS

## 2024-07-24 DIAGNOSIS — Z80.9 FAMILY HISTORY OF MALIGNANT NEOPLASM, UNSPECIFIED: ICD-10-CM

## 2024-07-24 DIAGNOSIS — M54.16 RADICULOPATHY, LUMBAR REGION: ICD-10-CM

## 2024-07-24 DIAGNOSIS — R20.0 ANESTHESIA OF SKIN: ICD-10-CM

## 2024-07-24 DIAGNOSIS — M48.062 SPINAL STENOSIS, LUMBAR REGION WITH NEUROGENIC CLAUDICATION: ICD-10-CM

## 2024-07-24 DIAGNOSIS — F17.200 NICOTINE DEPENDENCE, UNSPECIFIED, UNCOMPLICATED: ICD-10-CM

## 2024-07-24 DIAGNOSIS — G89.29 LOW BACK PAIN, UNSPECIFIED: ICD-10-CM

## 2024-07-24 DIAGNOSIS — M79.18 MYALGIA, OTHER SITE: ICD-10-CM

## 2024-07-24 DIAGNOSIS — M47.816 SPONDYLOSIS W/OUT MYELOPATHY OR RADICULOPATHY, LUMBAR REGION: ICD-10-CM

## 2024-07-24 DIAGNOSIS — M54.50 LOW BACK PAIN, UNSPECIFIED: ICD-10-CM

## 2024-07-24 DIAGNOSIS — R20.2 ANESTHESIA OF SKIN: ICD-10-CM

## 2024-07-24 DIAGNOSIS — M21.371 FOOT DROP, RIGHT FOOT: ICD-10-CM

## 2024-07-24 PROCEDURE — 99214 OFFICE O/P EST MOD 30 MIN: CPT

## 2024-07-24 PROCEDURE — G2211 COMPLEX E/M VISIT ADD ON: CPT

## 2024-07-24 NOTE — REASON FOR VISIT
[Initial Evaluation] : an initial evaluation [FreeTextEntry1] : for R leg pain referred by Dr. Colon and PCP Dr. Deepali Vanegas

## 2024-07-24 NOTE — REVIEW OF SYSTEMS
[Patient Intake Form Reviewed] : Patient intake form was reviewed [Joint Pain] : joint pain [Joint Stiffness] : joint stiffness [Fever] : no fever [Chills] : no chills [Fatigue] : no fatigue

## 2024-07-24 NOTE — HISTORY OF PRESENT ILLNESS
[FreeTextEntry1] :  Ms. JAMIE HUTTON is a very pleasant 73 year female who comes in for evaluation of R leg pain that has been ongoing for 1 year without any specific injury or inciting event. The pain is located primarily upper leg and ankle (feels weak) intermittent in nature and described as sharp, shooting, with numbness. The pain is rated as 8/10 during today's visit, and ranges from 2-8/10. The patient's symptoms are aggravated by walking and alleviated by rest. The patient denies any night pain, numbness/tingling, weakness, or bowel/bladder dysfunction. The patient has no other complaints at this time.   The patient has previously tried FANY which does not help Her ankle is numb.  also the R index is numb -the tip -has been for years    She is retired. MR shows severe DDD  she has had numerous injections

## 2024-07-24 NOTE — ASSESSMENT
[FreeTextEntry1] :   PLAN AND RECOMMENDATIONS :   We discussed differential diagnosis and clinical impression  agree with EMG rule out neuropathy radiculopathy and or CTS UE and LE EMG Recommend .symptomatic care and support  medications as per Dr Kumar   imaging done    hydrotherapy /heat / cold for pain  continue  spinal precautions including care with bending, lifting, twisting and  carrying.  relative rest and avoidance of painful activity where possible  increasing activity as discussed  return for EMG  Information given to patient about EMG and Nerve Conduction Study Examination including  planning, differential diagnosis to rule in /rule out ,duration of the test ,precautions (if patient on blood thinner.has bleeding disorder or  pace maker device etc -still possible to undergo with care), side effects(benign-limited to short term bruising and discomfort/pain)   The protocol of temp checks upon arrival ,disinfection procedure of waiting room and the lab explained- reassured.  All questions answered.  Patient instructed to book appointment upon conclusion of appointment  Information sheet ' Answers to your Questions on EMG " forwarded to patient to read prior to testing, with further information about training,background and the procedure itself . The patient had the opportunity to ask questions and all were answered to their satisfaction. We will coordinate treatment with the other members of the treatment team. The patient verbalized understanding of the management/plan rationale and agreed with my recommendations.

## 2024-07-24 NOTE — PHYSICAL EXAM
[FreeTextEntry1] : PHYSICAL EXAM : OBJECTIVE   GENERAL : Awake ,alert and oriented to time place and person  HEAD : normocephalic and atraumatic  NECK : supple ,no tracheal deviation ,no thyroid enlargement noted with swallowing EYES : sclera and conjunctiva normal no redness,intact extraocular movements  ENT  : ears and nose normal in appearance -hearing adequate  PULMONARY: effort normal. No respiratory distress. breathing regular. No wheezes  LYMPH : No swelling in limbs, capillary return within normal range  CVS : warm extremities,no palpitations,not short of breath, no visible jugular venous distention PSYCH : mood and affect normal ,good eye contact ,normal attention  ABDOMEN : no visible distension ,  NEUROLOGICAL:cranial nerves intact,muscle tone normal,gait and balance safe except where noted below  SKIN : warm and dry No rash detected over specific body areas examined  MUSCULOSKELETAL: normal muscle bulk, no focal bony tenderness /posture normal except where specified below SLR neg  back stiff  kyphotic  ankle numbness  mild foot drop  no cane wide based gait

## 2024-08-15 ENCOUNTER — APPOINTMENT (OUTPATIENT)
Dept: PHYSICAL MEDICINE AND REHAB | Facility: CLINIC | Age: 74
End: 2024-08-15
Payer: MEDICARE

## 2024-08-15 DIAGNOSIS — G56.03 CARPAL TUNNEL SYNDROM,BILATERAL UPPER LIMBS: ICD-10-CM

## 2024-08-15 DIAGNOSIS — M21.371 FOOT DROP, RIGHT FOOT: ICD-10-CM

## 2024-08-15 DIAGNOSIS — M54.10 RADICULOPATHY, SITE UNSPECIFIED: ICD-10-CM

## 2024-08-15 DIAGNOSIS — G60.8 OTHER HEREDITARY AND IDIOPATHIC NEUROPATHIES: ICD-10-CM

## 2024-08-15 PROCEDURE — 95913 NRV CNDJ TEST 13/> STUDIES: CPT

## 2024-08-15 PROCEDURE — 95886 MUSC TEST DONE W/N TEST COMP: CPT

## 2024-08-15 NOTE — PROCEDURE
[de-identified] : EMG /NERVE CONDUCTION STUDY performed today without complication  Tabulated data, wave forms , conclusions and recommendations are attached and in the procedure report.  Please refer to the scanned study attached to this encounter  Full history and focused clinical exam performed prior to the examination and documented in report

## 2024-08-19 ENCOUNTER — NON-APPOINTMENT (OUTPATIENT)
Age: 74
End: 2024-08-19

## 2025-03-11 ENCOUNTER — NON-APPOINTMENT (OUTPATIENT)
Age: 75
End: 2025-03-11